# Patient Record
Sex: MALE | Race: WHITE
[De-identification: names, ages, dates, MRNs, and addresses within clinical notes are randomized per-mention and may not be internally consistent; named-entity substitution may affect disease eponyms.]

---

## 2023-08-09 LAB
ALBUMIN SERPL BCP-MCNC: 4.1 G/DL (ref 3.4–5)
ALBUMIN/GLOB SERPL: 1.2 {RATIO} (ref 1.1–1.5)
ALP SERPL-CCNC: 62 IU/L (ref 46–116)
ALT SERPL W P-5'-P-CCNC: 26 U/L (ref 30–65)
ANION GAP SERPL CALCULATED.3IONS-SCNC: 6 MMOL/L (ref 8–16)
AST SERPL W P-5'-P-CCNC: 15 U/L (ref 10–37)
BACTERIA URNS QL MICRO: (no result) /HPF
BASOPHILS # BLD AUTO: 0 X10'3 (ref 0–0.2)
BASOPHILS NFR BLD AUTO: 0.4 % (ref 0–1)
BILIRUB SERPL-MCNC: 0.4 MG/DL (ref 0–1)
BILIRUB UR QL STRIP: NEGATIVE
BUN SERPL-MCNC: 10 MG/DL (ref 7–18)
BUN/CREAT SERPL: 10.9 (ref 10–20)
CALCIUM SERPL-MCNC: 9.1 MG/DL (ref 8.5–10.1)
CHLORIDE SERPL-SCNC: 104 MMOL/L (ref 99–107)
CLARITY UR: (no result)
CO2 SERPL-SCNC: 31.6 MMOL/L (ref 24–32)
COLOR UR: YELLOW
CREAT CL PREDICTED SERPL C-G-VRATE: (no result) ML/MIN
CREAT SERPL-MCNC: 0.92 MG/DL (ref 0.6–1.1)
DEPRECATED SQUAMOUS URNS QL MICRO: (no result) /LPF
EOSINOPHIL # BLD AUTO: 0.1 X10'3 (ref 0–0.9)
EOSINOPHIL NFR BLD AUTO: 1.9 % (ref 0–6)
ERYTHROCYTE [DISTWIDTH] IN BLOOD BY AUTOMATED COUNT: 13.1 % (ref 11.5–14.5)
GFR SERPL CREATININE-BSD FRML MDRD: 87 ML/MIN
GLOBULIN SER CALC-MCNC: 3.3 G/DL (ref 2.7–4.3)
GLUCOSE SERPL-MCNC: 108 MG/DL (ref 70–104)
GLUCOSE UR STRIP-MCNC: NEGATIVE MG/DL
HCT VFR BLD AUTO: 50.2 % (ref 42–52)
HGB BLD-MCNC: 16.6 G/DL (ref 14–17.9)
HGB UR QL STRIP: NEGATIVE
KETONES UR STRIP-MCNC: NEGATIVE MG/DL
LEUKOCYTE ESTERASE UR QL STRIP: NEGATIVE
LYMPHOCYTES # BLD AUTO: 1.2 X10'3 (ref 1.1–4.8)
LYMPHOCYTES NFR BLD AUTO: 15.8 % (ref 21–51)
MCH RBC QN AUTO: 30.5 PG (ref 27–31)
MCHC RBC AUTO-ENTMCNC: 33 G/DL (ref 33–36.5)
MCV RBC AUTO: 92.4 FL (ref 78–98)
MONOCYTES # BLD AUTO: 0.5 X10'3 (ref 0–0.9)
MONOCYTES NFR BLD AUTO: 6 % (ref 2–12)
MUCOUS THREADS URNS QL MICRO: (no result) /LPF
NEUTROPHILS # BLD AUTO: 5.9 X10'3 (ref 1.8–7.7)
NEUTROPHILS NFR BLD AUTO: 75.9 % (ref 42–75)
NITRITE UR QL STRIP: NEGATIVE
PH UR STRIP: 6 [PH] (ref 4.8–8)
PLATELET # BLD AUTO: 253 X10'3 (ref 140–440)
PMV BLD AUTO: 7.4 FL (ref 7.4–10.4)
POTASSIUM SERPL-SCNC: 3.9 MMOL/L (ref 3.4–5.1)
PROT SERPL-MCNC: 7.4 G/DL (ref 6.4–8.2)
PROT UR QL STRIP: NEGATIVE MG/DL
RBC # BLD AUTO: 5.43 X10'6 (ref 4.7–6.1)
RBC #/AREA URNS HPF: (no result) /HPF (ref 0–2)
SODIUM SERPL-SCNC: 142 MMOL/L (ref 135–145)
SP GR UR STRIP: >=1.03 (ref 1–1.03)
URN COLLECT METHOD CLASS: (no result)
UROBILINOGEN UR STRIP-MCNC: 0.2 E.U/DL (ref 0.2–1)
WBC # BLD AUTO: 7.7 10'3 (ref 4.8–10.8)
WBC #/AREA URNS HPF: (no result) /HPF (ref 0–4)

## 2023-08-16 ENCOUNTER — HOSPITAL ENCOUNTER (OUTPATIENT)
Dept: HOSPITAL 94 - PAS | Age: 53
Discharge: HOME | End: 2023-08-16
Attending: SURGERY
Payer: COMMERCIAL

## 2023-08-16 VITALS
SYSTOLIC BLOOD PRESSURE: 150 MMHG | HEART RATE: 78 BPM | RESPIRATION RATE: 16 BRPM | DIASTOLIC BLOOD PRESSURE: 89 MMHG | OXYGEN SATURATION: 100 %

## 2023-08-16 VITALS
DIASTOLIC BLOOD PRESSURE: 79 MMHG | SYSTOLIC BLOOD PRESSURE: 124 MMHG | HEART RATE: 59 BPM | RESPIRATION RATE: 14 BRPM | OXYGEN SATURATION: 97 %

## 2023-08-16 VITALS
SYSTOLIC BLOOD PRESSURE: 121 MMHG | DIASTOLIC BLOOD PRESSURE: 77 MMHG | OXYGEN SATURATION: 97 % | RESPIRATION RATE: 19 BRPM | HEART RATE: 75 BPM

## 2023-08-16 VITALS
SYSTOLIC BLOOD PRESSURE: 108 MMHG | RESPIRATION RATE: 16 BRPM | OXYGEN SATURATION: 100 % | HEART RATE: 72 BPM | DIASTOLIC BLOOD PRESSURE: 58 MMHG

## 2023-08-16 VITALS
DIASTOLIC BLOOD PRESSURE: 69 MMHG | HEART RATE: 75 BPM | SYSTOLIC BLOOD PRESSURE: 104 MMHG | OXYGEN SATURATION: 97 % | RESPIRATION RATE: 18 BRPM

## 2023-08-16 VITALS
RESPIRATION RATE: 21 BRPM | OXYGEN SATURATION: 95 % | DIASTOLIC BLOOD PRESSURE: 79 MMHG | HEART RATE: 80 BPM | SYSTOLIC BLOOD PRESSURE: 124 MMHG

## 2023-08-16 VITALS
DIASTOLIC BLOOD PRESSURE: 77 MMHG | SYSTOLIC BLOOD PRESSURE: 126 MMHG | OXYGEN SATURATION: 97 % | HEART RATE: 56 BPM | RESPIRATION RATE: 16 BRPM

## 2023-08-16 VITALS
DIASTOLIC BLOOD PRESSURE: 63 MMHG | HEART RATE: 67 BPM | SYSTOLIC BLOOD PRESSURE: 106 MMHG | OXYGEN SATURATION: 94 % | RESPIRATION RATE: 16 BRPM

## 2023-08-16 VITALS
RESPIRATION RATE: 17 BRPM | OXYGEN SATURATION: 92 % | SYSTOLIC BLOOD PRESSURE: 97 MMHG | HEART RATE: 64 BPM | DIASTOLIC BLOOD PRESSURE: 59 MMHG

## 2023-08-16 VITALS
HEART RATE: 61 BPM | RESPIRATION RATE: 19 BRPM | OXYGEN SATURATION: 100 % | SYSTOLIC BLOOD PRESSURE: 130 MMHG | DIASTOLIC BLOOD PRESSURE: 71 MMHG

## 2023-08-16 VITALS — WEIGHT: 150 LBS | HEIGHT: 70 IN | BODY MASS INDEX: 21.47 KG/M2

## 2023-08-16 VITALS
HEART RATE: 81 BPM | DIASTOLIC BLOOD PRESSURE: 77 MMHG | SYSTOLIC BLOOD PRESSURE: 121 MMHG | RESPIRATION RATE: 16 BRPM | OXYGEN SATURATION: 95 %

## 2023-08-16 VITALS
HEART RATE: 74 BPM | RESPIRATION RATE: 16 BRPM | TEMPERATURE: 98.1 F | DIASTOLIC BLOOD PRESSURE: 85 MMHG | OXYGEN SATURATION: 98 % | SYSTOLIC BLOOD PRESSURE: 137 MMHG

## 2023-08-16 VITALS
SYSTOLIC BLOOD PRESSURE: 133 MMHG | RESPIRATION RATE: 18 BRPM | DIASTOLIC BLOOD PRESSURE: 78 MMHG | HEART RATE: 62 BPM | OXYGEN SATURATION: 100 %

## 2023-08-16 DIAGNOSIS — K42.0: Primary | ICD-10-CM

## 2023-08-16 DIAGNOSIS — Z98.890: ICD-10-CM

## 2023-08-16 DIAGNOSIS — Z87.891: ICD-10-CM

## 2023-08-16 DIAGNOSIS — Z79.899: ICD-10-CM

## 2023-08-16 DIAGNOSIS — K40.20: ICD-10-CM

## 2023-08-16 DIAGNOSIS — Z98.818: ICD-10-CM

## 2023-08-16 PROCEDURE — 93005 ELECTROCARDIOGRAM TRACING: CPT

## 2023-08-16 PROCEDURE — 80053 COMPREHEN METABOLIC PANEL: CPT

## 2023-08-16 PROCEDURE — 49594 RPR AA HRN 1ST 3-10 NCR/STRN: CPT

## 2023-08-16 PROCEDURE — 49650 LAP ING HERNIA REPAIR INIT: CPT

## 2023-08-16 PROCEDURE — 85025 COMPLETE CBC W/AUTO DIFF WBC: CPT

## 2023-08-16 PROCEDURE — 36415 COLL VENOUS BLD VENIPUNCTURE: CPT

## 2023-08-16 PROCEDURE — 81001 URINALYSIS AUTO W/SCOPE: CPT

## 2023-08-16 RX ADMIN — MORPHINE SULFATE PRN MG: 4 INJECTION, SOLUTION INTRAMUSCULAR; INTRAVENOUS at 19:40

## 2023-08-16 RX ADMIN — MORPHINE SULFATE PRN MG: 4 INJECTION, SOLUTION INTRAMUSCULAR; INTRAVENOUS at 19:05

## 2023-08-16 RX ADMIN — MEPERIDINE HYDROCHLORIDE PRN MG: 25 INJECTION, SOLUTION INTRAMUSCULAR; INTRAVENOUS; SUBCUTANEOUS at 19:09

## 2023-08-16 RX ADMIN — MEPERIDINE HYDROCHLORIDE PRN MG: 25 INJECTION, SOLUTION INTRAMUSCULAR; INTRAVENOUS; SUBCUTANEOUS at 18:54

## 2023-08-16 RX ADMIN — MEPERIDINE HYDROCHLORIDE PRN MG: 25 INJECTION, SOLUTION INTRAMUSCULAR; INTRAVENOUS; SUBCUTANEOUS at 19:58

## 2023-08-16 NOTE — NUR
DISCUSSED ALL DC INSTRUCTIONS, CATHETER CARE, CO2 RETENTION., DIET, CONSTIPATION, 
RESTRICTIONS, BOWEL CARE AND REGIME, DIET TO WIFE WHO SHOWED GOOD UNDERSTANDING AND 
COMPREHENSION AND UNDERSTANDING OF CONTENT.

-------------------------------------------------------------------------------

Addendum: 08/16/23 at 2038 by Piyush Wang RN, RN

-------------------------------------------------------------------------------

Amended: Links added.

## 2023-08-16 NOTE — NUR
SURGERY DELAYED R/T ADD ON CASE. PT AND WIFE NOTIFIED OF DELAY. ADMIT PROCESS COMPLETED 
MINUS IV INSERTION UNTIL CLOSER TO SURGERY TIME R/T POSSIBLE CANCEL. 1 CUP OF APPLE JUICE 
GIVEN PER PT REQUEST. TRANSFER OF CARE TO [facility], REPORT GIVEN WITH ALL QUESTIONS 
ANSWERED.

## 2023-08-16 NOTE — NUR
Received from OR via [facility] , accompanied by Anesthesiologist LOYD and report given by 
Anesthesiolgist. [facility] UNABLE TO OBTAIN A BP 20G PIV IN LEFT HAND 
RUNNING LR . PATIENT ON 10L MASK WITH 100% SATURATIONS. MEDICATED FOR PAIN UPON 
ARRIVAL AND WILL CONTINUE TO TREAT.


-------------------------------------------------------------------------------

Addendum: 08/16/23 at 1903 by Piyush Wang RN, RN

-------------------------------------------------------------------------------

Amended: Links added.

## 2023-08-16 NOTE — NUR
Patient ready for d/c per MD orders. All d/c ppwk was rev'd with patient and patient wife. 
All questions, comments, concerns were answered at this time. Educated on the importance 
MONITORING FEELING OF NEED TO URINATE, no bearing down, constipation, remembering to 
breathe. Patient was able to ambulate safely with no loss of balance with minimal assistance 
within the department BUT was UNable to urinate. Bladder scanned with < 20CC in the bladder. 
3 abd lap sites are CDI. VSS WERE sufficient to d/c per POLICY . Patient was wheeled out in 
W/C to private vehicle where family was waiting. Transferred to vehicle without incident. 

PATIENT AND WIFE DECIDED NOT TO VOID PRIOR TO DC 2' ONLY 20CC IN THE BLADDER. DECISION WAS 
MADE BY PATIENT AND WIFE. BOTH AGREE TO COME BACK INTO THE ER IF HE CANNOT VOID UPON SENSING 
NEED TO VOID.  PATIENT AND WIFE UNDERSTAND RISKS OF NOT GETTING CATHETERIZED PRIOR TO 
LEAVING AND ARE WILLING TO COME BACK TO THE ER IMMEDIATELY IF PATIENT IS UNABLE TO VOID UPON 
SENSING  A NEED BUT HAS INABILITY TO VOID.  PATIENT AND SPOUSE EDUCATED ON RISKS OF A FULL 
BLADDER AND INABILITY TO VOID YET STILL DESIRE TO GO HOME WITHOUT VOIDING. WILL CALL 
TOMORROW AND CHECK ON PATIENT AS WE ROUTINELY DO AND ASSESS AND ADVISE AS NECESSARY. 

-------------------------------------------------------------------------------

Addendum: 08/16/23 at 2152 by Piyush Wang RN RN

-------------------------------------------------------------------------------

Amended: Links added.